# Patient Record
Sex: MALE | Race: WHITE
[De-identification: names, ages, dates, MRNs, and addresses within clinical notes are randomized per-mention and may not be internally consistent; named-entity substitution may affect disease eponyms.]

---

## 2018-06-02 ENCOUNTER — HOSPITAL ENCOUNTER (EMERGENCY)
Dept: HOSPITAL 25 - UCCORT | Age: 43
Discharge: HOME | End: 2018-06-02
Payer: COMMERCIAL

## 2018-06-02 VITALS — SYSTOLIC BLOOD PRESSURE: 126 MMHG | DIASTOLIC BLOOD PRESSURE: 73 MMHG

## 2018-06-02 DIAGNOSIS — L23.7: Primary | ICD-10-CM

## 2018-06-02 PROCEDURE — G0463 HOSPITAL OUTPT CLINIC VISIT: HCPCS

## 2018-06-02 PROCEDURE — 99212 OFFICE O/P EST SF 10 MIN: CPT

## 2018-06-02 NOTE — UC
Skin Complaint HPI





- HPI Summary


HPI Summary: 





Rash to forearms after gardening 6 days ago. itcy. tx with calamine. no fever.





- History of Current Complaint


Hx Obtained From: Patient


Onset/Duration: Gradual Onset


Timing: Constant


Aggravating Factor(s): Nothing


Alleviating Factor(s): Nothing


Associated Signs & Symptoms: Positive: Rash.  Negative: Difficulty Breathing, 

Fever





<Madie Pate - Last Filed: 06/02/18 13:32>





<Micaela Simmons - Last Filed: 06/02/18 17:41>





- History of Current Complaint


Time Seen by Provider: 06/02/18 13:23


Stated Complaint: SKIN COMPLAINT





- Allergy/Home Medications


Allergies/Adverse Reactions: 


 Allergies











Allergy/AdvReac Type Severity Reaction Status Date / Time


 


No Known Allergies Allergy   Verified 06/02/18 13:36














Review of Systems


Constitutional: Negative


Skin: Rash


Eyes: Negative


ENT: Negative


Respiratory: Negative


Cardiovascular: Negative


Gastrointestinal: Negative


Genitourinary: Negative


Motor: Negative


Neurovascular: Negative


Musculoskeletal: Negative


Neurological: Negative


Psychological: Negative


Is Patient Immunocompromised?: No


All Other Systems Reviewed And Are Negative: Yes





<Madie Pate - Last Filed: 06/02/18 13:32>





PMH/Surg Hx/FS Hx/Imm Hx


Previously Healthy: Yes





- Surgical History


Surgical History: None





- Family History


Known Family History: Positive: None


Family History: NON CONTRIBUTORY





- Social History


Occupation: Employed Full-time


Lives: With Family


Alcohol Use: Occasionally


Substance Use Type: None


Smoking Status (MU): Never Smoked Tobacco





- Immunization History


Vaccination Up to Date: Yes





<Madie Pate - Last Filed: 06/02/18 13:32>





Physical Exam


Triage Information Reviewed: Yes


Appearance: Well-Appearing


Vital Signs Reviewed: Yes


Eyes: Positive: Conjunctiva Clear


ENT: Positive: Pharyngeal erythema, TMs normal.  Negative: Nasal congestion, 

Nasal drainage


Neck: Positive: Supple, Nontender, No Lymphadenopathy


Respiratory: Positive: Lungs clear, Normal breath sounds


Cardiovascular: Positive: RRR, No Murmur


Abdomen Description: Positive: Nontender, No Organomegaly, Soft


Bowel Sounds: Positive: Present


Musculoskeletal: Positive: ROM Intact


Neurological: Positive: Alert


Psychological: Positive: Age Appropriate Behavior


Skin Exam: Normal, Other - linear vesicular rash and patches of red with some 

vesicle to volar sirfaces. no hot, steaking and no d/c.





<Madie Pate - Last Filed: 06/02/18 13:32>


Vital Signs: 


 Initial Vital Signs











Temp  98.7 F   06/02/18 13:29


 


Pulse  76   06/02/18 13:29


 


Resp  18   06/02/18 13:29


 


BP  126/73   06/02/18 13:29


 


Pulse Ox  100   06/02/18 13:29














<Micaela Simmons - Last Filed: 06/02/18 17:41>





Course/Dx





- Diagnoses


Provider Diagnoses: Contoact dermatitis(poison ivy)





<Madie Pate - Last Filed: 06/02/18 13:32>





Discharge





- Sign-Out/Discharge


Documenting (check all that apply): Discharge/Admit/Transfer





- Billing Disposition and Condition


Condition: STABLE


Disposition: HOME





<Madei Pate - Last Filed: 06/02/18 13:32>





- Billing Disposition and Condition


Condition: STABLE


Disposition: HOME





<Micaela Simmons - Last Filed: 06/02/18 17:41>





- Discharge Plan


Condition: Stable


Disposition: HOME


Prescriptions: 


methylPREDNISolone [Medrol Dosepak 4 MG*] 0 mg PO .SEE LON INSTRUCTION #1 tab


Patient Education Materials:  Poison Ivy (ED)


Referrals: 


[facility] [facility] [place]


Additional Instructions: 


CALL MONDAY, [facility] [facility] [place] HELP YOU FIND A DOCTOR





Attestation Statement


User Type: Provider - I was available for consult. This patient was seen by the 

[place]. The patient was not presented to, seen by, or examined by me. -Denise





<Micaela Simmons - Last Filed: 06/02/18 17:41>